# Patient Record
(demographics unavailable — no encounter records)

---

## 2025-01-28 NOTE — HISTORY OF PRESENT ILLNESS
[FreeTextEntry1] : FT. No complications. No NICU stay.  Asthma, PCN allergy, Allergic Rhinitis, Chronic nasal congestion, +Loud snoring. PSG scheduled for 10/2024 A&I eval: Jan 2024: Daily Zyrtec. +environmental allergies  PSG obtained Oct 2024: No SDB.     FOLLOW UP: Jan 27, 2025 Last seen June 2024 - Recs: ENT eval, PSG Oct 2024, Fluticasone Propionate HFA 44mcg 2 puffs BID or Budesonide 0.5mg 1 vial BID, albuterol PRN.    Interval hx: - PSG obtained 10/2024, NO SDB.  - reports compliance with - No interval oral steroids. - No interval ER visits/hospitalizations. Daily meds: Rescue meds: Baseline daytime cough, SOB or wheeze: Baseline nocturnal cough, SOB or wheeze: Exertional cough, SOB or wheeze: RONI sx: CARLOS sx: Allergic rhinitis symptoms: COVID 19 vaccine: Flu Vaccine 2024- 2025:   Modified Asthma Predictive Index (mAPI): 4 wheezing illnesses AND 1 major criteria Parental asthma: YES atopic dermatitis: NO Aeroallergen sensitization suspected: YES   OR   2 minor criteria Food sensitization: NO Peripheral blood eosinophilia =4%: Wheezing apart from colds:  NO

## 2025-01-28 NOTE — SOCIAL HISTORY
[Mother] : mother [Father] : father [Brother] : brother [Sister] : sister [Grade:  _____] : Grade: [unfilled] [None] : none [Bedroom] : not in the bedroom [Living Area] : not in the living area [Smokers in Household] : there are no smokers in the home

## 2025-01-28 NOTE — REVIEW OF SYSTEMS
[NI] : Genitourinary  [Nl] : Hematologic/Lymphatic [Snoring] : snoring [Nasal Congestion] : nasal congestion [Cough] : cough [Sleep Disturbances] : ~T sleep disturbances [Immunizations are up to date] : Immunizations are up to date [Influenza Vaccine this Past Year] : influenza vaccine this past year [Heart Disease] : no heart disease [Wheezing] : no wheezing [Pneumonia] : no pneumonia [Spitting Up] : not spitting up [Reflux] : no reflux [Eczema] : no ezcema [Failure To Thrive] : no failure to thrive

## 2025-04-03 NOTE — HISTORY OF PRESENT ILLNESS
[FreeTextEntry1] : FT. No complications. No NICU stay.  Asthma, PCN allergy, Allergic Rhinitis, Chronic nasal congestion, +Loud snoring. PSG obtained: 10/2024: No RONI.  A&I eval: Jan 2024: Daily Zyrtec. +environmental allergies (+DM) Zyrtec used in the spring and Claritin in the winter.     FOLLOW UP: Apr 03, 2025 Last seen (June 2024) - Recs: PSG Oct 2024, Asmanex 50mcg 2 puffs BID, Albuterol PRN.    Interval hx: - Overall doing well ACT = 27 - Mother was administering ICS 2 puffs Qday and increasing to 2puffs BID for two weeks with URIs. Not 2 puffs BID as recommended.  - Frequent URIs this past winter, needed to increase ICS to 2 puffs BID for two weeks at least every month.  - PSG obtained Oct 2024, no RONI.  - albuterol PRN: Lats used Feb 2025 with URI.  - Flu A+ February 2025. Did well with albuterol.  - No interval oral steroids. - No interval ER visits/hospitalizations.    Daily meds: Asmanex 50mcg 2puffs Qday, zyrtec daily for spring, Claritin for winter.  Compliance with Spacer:  YES   Baseline daytime cough, SOB or wheeze: denies    Baseline nocturnal cough, SOB or wheeze: denies  Exertional cough, SOB or wheeze: YES, intermittent cough   RONI sx: YES: loud snoring and pauses in breathing.   CARLOS sx: denies  Allergic rhinitis symptoms: YES   Modified Asthma Predictive Index (mAPI): 4 wheezing illnesses AND 1 major criteria Parental asthma: YES atopic dermatitis: NO Aeroallergen sensitization suspected: YES   OR   2 minor criteria Food sensitization: NO Peripheral blood eosinophilia =4%: Wheezing apart from colds:  NO

## 2025-04-03 NOTE — REVIEW OF SYSTEMS
[Heart Disease] : no heart disease [Wheezing] : no wheezing [Pneumonia] : no pneumonia [Spitting Up] : not spitting up [Reflux] : no reflux [Eczema] : no ezcema [Failure To Thrive] : no failure to thrive